# Patient Record
Sex: MALE | HISPANIC OR LATINO | URBAN - METROPOLITAN AREA
[De-identification: names, ages, dates, MRNs, and addresses within clinical notes are randomized per-mention and may not be internally consistent; named-entity substitution may affect disease eponyms.]

---

## 2018-07-30 ENCOUNTER — CONSULT (OUTPATIENT)
Dept: URBAN - METROPOLITAN AREA CLINIC 30 | Facility: CLINIC | Age: 53
End: 2018-07-30
Payer: COMMERCIAL

## 2018-07-30 PROCEDURE — 92004 COMPRE OPH EXAM NEW PT 1/>: CPT | Performed by: OPHTHALMOLOGY

## 2018-07-30 PROCEDURE — 92134 CPTRZ OPH DX IMG PST SGM RTA: CPT | Performed by: OPHTHALMOLOGY

## 2018-07-30 ASSESSMENT — INTRAOCULAR PRESSURE
OD: 15
OS: 10

## 2019-04-22 ENCOUNTER — FOLLOW UP ESTABLISHED (OUTPATIENT)
Dept: URBAN - METROPOLITAN AREA CLINIC 30 | Facility: CLINIC | Age: 54
End: 2019-04-22
Payer: COMMERCIAL

## 2019-04-22 PROCEDURE — 92134 CPTRZ OPH DX IMG PST SGM RTA: CPT | Performed by: OPHTHALMOLOGY

## 2019-04-22 PROCEDURE — 92014 COMPRE OPH EXAM EST PT 1/>: CPT | Performed by: OPHTHALMOLOGY

## 2019-04-22 ASSESSMENT — INTRAOCULAR PRESSURE
OD: 18
OS: 14

## 2019-05-20 ENCOUNTER — FOLLOW UP ESTABLISHED (OUTPATIENT)
Dept: URBAN - METROPOLITAN AREA CLINIC 30 | Facility: CLINIC | Age: 54
End: 2019-05-20
Payer: COMMERCIAL

## 2019-05-20 DIAGNOSIS — E11.3513 TYPE 2 DIAB WITH PROLIF DIAB RTNOP WITH MACULAR EDEMA, BI: Primary | ICD-10-CM

## 2019-05-20 PROCEDURE — 92235 FLUORESCEIN ANGRPH MLTIFRAME: CPT | Performed by: OPHTHALMOLOGY

## 2019-05-20 PROCEDURE — 92250 FUNDUS PHOTOGRAPHY W/I&R: CPT | Performed by: OPHTHALMOLOGY

## 2019-05-20 ASSESSMENT — INTRAOCULAR PRESSURE
OD: 26
OS: 22

## 2019-07-01 ENCOUNTER — FOLLOW UP ESTABLISHED (OUTPATIENT)
Dept: URBAN - METROPOLITAN AREA CLINIC 30 | Facility: CLINIC | Age: 54
End: 2019-07-01
Payer: COMMERCIAL

## 2019-07-01 DIAGNOSIS — Z79.4 LONG TERM (CURRENT) USE OF INSULIN: ICD-10-CM

## 2019-07-01 PROCEDURE — 92134 CPTRZ OPH DX IMG PST SGM RTA: CPT | Performed by: OPHTHALMOLOGY

## 2019-07-01 ASSESSMENT — INTRAOCULAR PRESSURE
OD: 16
OS: 16

## 2019-08-12 ENCOUNTER — FOLLOW UP ESTABLISHED (OUTPATIENT)
Dept: URBAN - METROPOLITAN AREA CLINIC 30 | Facility: CLINIC | Age: 54
End: 2019-08-12
Payer: COMMERCIAL

## 2019-08-12 PROCEDURE — 92014 COMPRE OPH EXAM EST PT 1/>: CPT | Performed by: OPHTHALMOLOGY

## 2019-08-12 PROCEDURE — 92134 CPTRZ OPH DX IMG PST SGM RTA: CPT | Performed by: OPHTHALMOLOGY

## 2019-08-12 ASSESSMENT — INTRAOCULAR PRESSURE
OD: 19
OS: 18

## 2019-11-18 ENCOUNTER — FOLLOW UP ESTABLISHED (OUTPATIENT)
Dept: URBAN - METROPOLITAN AREA CLINIC 30 | Facility: CLINIC | Age: 54
End: 2019-11-18
Payer: COMMERCIAL

## 2019-11-18 PROCEDURE — 92250 FUNDUS PHOTOGRAPHY W/I&R: CPT | Performed by: OPHTHALMOLOGY

## 2019-11-18 PROCEDURE — 92235 FLUORESCEIN ANGRPH MLTIFRAME: CPT | Performed by: OPHTHALMOLOGY

## 2020-01-13 ENCOUNTER — FOLLOW UP ESTABLISHED (OUTPATIENT)
Dept: URBAN - METROPOLITAN AREA CLINIC 30 | Facility: CLINIC | Age: 55
End: 2020-01-13
Payer: COMMERCIAL

## 2020-01-13 PROCEDURE — 92134 CPTRZ OPH DX IMG PST SGM RTA: CPT | Performed by: OPHTHALMOLOGY

## 2020-01-13 ASSESSMENT — INTRAOCULAR PRESSURE
OS: 21
OD: 21

## 2020-04-06 ENCOUNTER — FOLLOW UP ESTABLISHED (OUTPATIENT)
Dept: URBAN - METROPOLITAN AREA CLINIC 30 | Facility: CLINIC | Age: 55
End: 2020-04-06
Payer: COMMERCIAL

## 2020-04-06 DIAGNOSIS — H25.13 AGE-RELATED NUCLEAR CATARACT, BILATERAL: ICD-10-CM

## 2020-04-06 PROCEDURE — 92014 COMPRE OPH EXAM EST PT 1/>: CPT | Performed by: OPHTHALMOLOGY

## 2020-04-06 PROCEDURE — 92134 CPTRZ OPH DX IMG PST SGM RTA: CPT | Performed by: OPHTHALMOLOGY

## 2020-04-06 ASSESSMENT — INTRAOCULAR PRESSURE
OS: 16
OD: 15

## 2020-05-18 ENCOUNTER — FOLLOW UP ESTABLISHED (OUTPATIENT)
Dept: URBAN - METROPOLITAN AREA CLINIC 30 | Facility: CLINIC | Age: 55
End: 2020-05-18
Payer: COMMERCIAL

## 2020-05-18 PROCEDURE — 92250 FUNDUS PHOTOGRAPHY W/I&R: CPT | Performed by: OPHTHALMOLOGY

## 2020-05-18 ASSESSMENT — INTRAOCULAR PRESSURE
OD: 22
OS: 17

## 2020-08-24 ENCOUNTER — FOLLOW UP ESTABLISHED (OUTPATIENT)
Dept: URBAN - METROPOLITAN AREA CLINIC 30 | Facility: CLINIC | Age: 55
End: 2020-08-24
Payer: COMMERCIAL

## 2020-08-24 PROCEDURE — 92235 FLUORESCEIN ANGRPH MLTIFRAME: CPT | Performed by: OPHTHALMOLOGY

## 2020-08-24 PROCEDURE — 92250 FUNDUS PHOTOGRAPHY W/I&R: CPT | Performed by: OPHTHALMOLOGY

## 2020-08-24 ASSESSMENT — INTRAOCULAR PRESSURE
OS: 19
OD: 20

## 2020-11-02 ENCOUNTER — FOLLOW UP ESTABLISHED (OUTPATIENT)
Dept: URBAN - METROPOLITAN AREA CLINIC 30 | Facility: CLINIC | Age: 55
End: 2020-11-02
Payer: COMMERCIAL

## 2020-11-02 PROCEDURE — 92134 CPTRZ OPH DX IMG PST SGM RTA: CPT | Performed by: OPHTHALMOLOGY

## 2020-11-02 ASSESSMENT — INTRAOCULAR PRESSURE
OS: 19
OD: 22

## 2021-01-26 ENCOUNTER — FOLLOW UP ESTABLISHED (OUTPATIENT)
Dept: URBAN - METROPOLITAN AREA CLINIC 10 | Facility: CLINIC | Age: 56
End: 2021-01-26
Payer: COMMERCIAL

## 2021-01-26 PROCEDURE — 99214 OFFICE O/P EST MOD 30 MIN: CPT | Performed by: OPHTHALMOLOGY

## 2021-01-26 PROCEDURE — 92134 CPTRZ OPH DX IMG PST SGM RTA: CPT | Performed by: OPHTHALMOLOGY

## 2021-01-26 PROCEDURE — 67515 INJECT/TREAT EYE SOCKET: CPT | Performed by: OPHTHALMOLOGY

## 2021-01-26 ASSESSMENT — INTRAOCULAR PRESSURE
OD: 16
OS: 20

## 2021-04-05 ENCOUNTER — OFFICE VISIT (OUTPATIENT)
Dept: URBAN - METROPOLITAN AREA CLINIC 30 | Facility: CLINIC | Age: 56
End: 2021-04-05
Payer: COMMERCIAL

## 2021-04-05 PROCEDURE — 92235 FLUORESCEIN ANGRPH MLTIFRAME: CPT | Performed by: OPHTHALMOLOGY

## 2021-04-05 PROCEDURE — 92134 CPTRZ OPH DX IMG PST SGM RTA: CPT | Performed by: OPHTHALMOLOGY

## 2021-04-05 PROCEDURE — 67028 INJECTION EYE DRUG: CPT | Performed by: OPHTHALMOLOGY

## 2021-04-05 ASSESSMENT — INTRAOCULAR PRESSURE
OS: 19
OD: 27

## 2021-04-05 NOTE — IMPRESSION/PLAN
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 Joann Rosen - Good prior PRP Lsr / Kimmy Bustamante inj - PERSIST DME '18 -POOR. MUST improve BG. AMA '18-19 d/t loss job - RESUMED]]. TODAY, EyLea OU (proc note)   Imprv'd  BG / wt / BP. Counseled pt   
   RTC extend 8-9w ND/inj Eylea OS (SUSPEND OD xJan '21)    Future surgx OS Future Phaco / VIT / Laser OS  to  reduce inj? May suspend OD or FLTx OD ?

## 2021-04-05 NOTE — IMPRESSION/PLAN
Impression: Use of insulin Plan: URGED pt to do his part to lower BG / BP. MUST Improve BG
  Prior tx Dr. Ander Cruz / Mariana Canales.  PDR prior [facility] OU '17 DM / Drew / Luis Manuel Mendez OU injx      PERSIST DME '18 - 19 MUST have approvals Kody Zuniga / Luis Manuel Mendez for injx care, AMA '18-19 d/t job / insurance ?

## 2021-06-14 ENCOUNTER — OFFICE VISIT (OUTPATIENT)
Dept: URBAN - METROPOLITAN AREA CLINIC 30 | Facility: CLINIC | Age: 56
End: 2021-06-14
Payer: COMMERCIAL

## 2021-06-14 PROCEDURE — 92134 CPTRZ OPH DX IMG PST SGM RTA: CPT | Performed by: OPHTHALMOLOGY

## 2021-06-14 PROCEDURE — 67028 INJECTION EYE DRUG: CPT | Performed by: OPHTHALMOLOGY

## 2021-06-14 ASSESSMENT — INTRAOCULAR PRESSURE
OS: 23
OD: 16

## 2021-06-14 NOTE — IMPRESSION/PLAN
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 Wilmer Foss - Good prior PRP Lsr / Ronald Pugh inj - PERSIST DME '18 -POOR. MUST improve BG. AMA '18-19 d/t loss job - RESUMED]]. TODAY, EyLea OS (proc note)   Imprv'd  BG / wt / BP. Counseled pt   
   RTC extend 9w dil, OCT, eval - h/o Eylea OS (SUSPEND OD xJan '21)  Future surg OS Future Phaco / VIT / Laser OS  to  reduce inj Summer '21? May suspend OD /FLTx OD ?

## 2021-08-09 ENCOUNTER — OFFICE VISIT (OUTPATIENT)
Dept: URBAN - METROPOLITAN AREA CLINIC 30 | Facility: CLINIC | Age: 56
End: 2021-08-09
Payer: COMMERCIAL

## 2021-08-09 PROCEDURE — 92134 CPTRZ OPH DX IMG PST SGM RTA: CPT | Performed by: OPHTHALMOLOGY

## 2021-08-09 PROCEDURE — 99214 OFFICE O/P EST MOD 30 MIN: CPT | Performed by: OPHTHALMOLOGY

## 2021-08-09 PROCEDURE — 67028 INJECTION EYE DRUG: CPT | Performed by: OPHTHALMOLOGY

## 2021-08-09 ASSESSMENT — INTRAOCULAR PRESSURE
OD: 13
OS: 20

## 2021-08-09 NOTE — IMPRESSION/PLAN
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 Taylor Tolentino - Good prior PRP Lsr / Katya Menchaca inj - PERSIST DME '18 -POOR. MUST improve BG. AMA '18-19 d/t loss job - RESUMED]]. TODAY, EyLea OS (proc note)   Imprv'd  BG / wt / BP. Counseled pt   
   RTC extend 10-11w FA /  Eylea OS (SUSPEND OD xJan '21)     Future surg OS Future Phaco / VIT / Laser OS  to  reduce inj FALL '21? May suspend OD /FLTx OD ?

## 2021-08-09 NOTE — IMPRESSION/PLAN
Impression: Use of insulin Plan: URGED pt to do his part to lower BG / BP. MUST Improve BG
  Prior tx Dr. Leatha Kinney / Suri Real.  PDR prior [facility] OU '17 DM / Oni Olivarez / Doylene Needle OU injx      PERSIST DME '18 - 19 MUST have approvals Elly Andrews / Doylene Tucker for injx care,  AMA '18-19 d/t job / insurance ?

## 2021-08-09 NOTE — IMPRESSION/PLAN
Impression: Cataract OU Plan: TODAY repeat exam w DM cataract worse than expected for age (d/t DM). Again, URGED pt to keep f/u w gen eye care team.    Future phaco / vit if difficult comply w chronic injx or if recur DME w extended inj?    FALL '21 plan VIT / PHACO

## 2021-11-15 ENCOUNTER — OFFICE VISIT (OUTPATIENT)
Dept: URBAN - METROPOLITAN AREA CLINIC 30 | Facility: CLINIC | Age: 56
End: 2021-11-15
Payer: COMMERCIAL

## 2021-11-15 PROCEDURE — 67028 INJECTION EYE DRUG: CPT | Performed by: OPHTHALMOLOGY

## 2021-11-15 PROCEDURE — 92235 FLUORESCEIN ANGRPH MLTIFRAME: CPT | Performed by: OPHTHALMOLOGY

## 2021-11-15 PROCEDURE — 92134 CPTRZ OPH DX IMG PST SGM RTA: CPT | Performed by: OPHTHALMOLOGY

## 2021-11-15 PROCEDURE — 92250 FUNDUS PHOTOGRAPHY W/I&R: CPT | Performed by: OPHTHALMOLOGY

## 2021-11-15 ASSESSMENT — INTRAOCULAR PRESSURE
OD: 24
OS: 26

## 2021-11-15 NOTE — IMPRESSION/PLAN
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 Suri Lucero Pour - Good prior PRP Lsr / Doylene Needle inj - PERSIST DME '18 -POOR. MUST improve BG. AMA '18-19 d/t loss job - RESUMED]]. TODAY, EyLea OS (proc note)   Imprv'd  BG/wt/ BP. Counseled pt   
    RTC   (SUSPEND OD xJan '21)  OK NOW to plan Phaco / VIT   --- WIll be LEFT eye PHaco/VIT /FLTx Lsr (LIGHT PRP) OS to reduce inj - May suspend OD /FLTx OD ? Future extend injx OS too.

## 2021-11-15 NOTE — IMPRESSION/PLAN
Impression: Cataract OU Plan: TODAY repeat exam w DM cataract worse than expected for age (d/t DM). Again, URGED pt to keep f/u w gen eye care team.    Future phaco / vit if difficult comply w chronic injx or if recur DME w extended inj? FALL '21 plan VIT / PHACO  -- see other plan.

## 2021-11-15 NOTE — IMPRESSION/PLAN
Impression: Use of insulin - URGED pt to do his part to lower BG / BP. Plan:   MUST Improve BG -- Prior tx Dr. Jovan Nagel / Taylor Mancera.  PDR prior [facility] OU '17 DM / Drew / Katya Menchaca OU injx      PERSIST DME '18 - 19 MUST have approvals Stephanie Fleming / Katya Menchaca for injx care,  AMA '18-19 d/t job / insurance ?

## 2022-01-11 ENCOUNTER — TESTING ONLY (OUTPATIENT)
Dept: URBAN - METROPOLITAN AREA CLINIC 30 | Facility: CLINIC | Age: 57
End: 2022-01-11
Payer: COMMERCIAL

## 2022-01-11 DIAGNOSIS — Z01.818 ENCOUNTER FOR OTHER PREPROCEDURAL EXAMINATION: Primary | ICD-10-CM

## 2022-01-11 PROCEDURE — 99203 OFFICE O/P NEW LOW 30 MIN: CPT | Performed by: PHYSICIAN ASSISTANT

## 2022-01-11 RX ORDER — ATORVASTATIN CALCIUM 10 MG/1
10 MG TABLET, FILM COATED ORAL
Qty: 0 | Refills: 0 | Status: ACTIVE
Start: 2022-01-11

## 2022-01-11 RX ORDER — INSULIN DETEMIR 100 [IU]/ML
INJECTION, SOLUTION SUBCUTANEOUS
Qty: 0 | Refills: 0 | Status: ACTIVE
Start: 2022-01-11

## 2022-01-11 RX ORDER — LISINOPRIL 40 MG/1
40 MG TABLET ORAL
Qty: 0 | Refills: 0 | Status: ACTIVE
Start: 2022-01-11

## 2022-01-11 RX ORDER — METFORMIN HYDROCHLORIDE 1000 MG/1
TABLET, FILM COATED ORAL
Qty: 0 | Refills: 0 | Status: ACTIVE
Start: 2022-01-11

## 2022-01-11 ASSESSMENT — PACHYMETRY
OS: 3.08
OD: 23.15
OS: 23.17
OD: 3.14

## 2022-01-14 ENCOUNTER — SURGERY (OUTPATIENT)
Dept: URBAN - METROPOLITAN AREA SURGERY 12 | Facility: SURGERY | Age: 57
End: 2022-01-14
Payer: COMMERCIAL

## 2022-01-14 PROCEDURE — 67040 LASER TREATMENT OF RETINA: CPT | Performed by: OPHTHALMOLOGY

## 2022-01-15 ENCOUNTER — POST-OPERATIVE VISIT (OUTPATIENT)
Dept: URBAN - METROPOLITAN AREA CLINIC 10 | Facility: CLINIC | Age: 57
End: 2022-01-15
Payer: COMMERCIAL

## 2022-01-15 DIAGNOSIS — Z48.810 ENCOUNTER FOR SURGICAL AFTERCARE FOLLOWING SURGERY ON A SENSE ORGAN: Primary | ICD-10-CM

## 2022-01-15 PROCEDURE — 99024 POSTOP FOLLOW-UP VISIT: CPT | Performed by: OPTOMETRIST

## 2022-01-15 ASSESSMENT — INTRAOCULAR PRESSURE: OS: 22

## 2022-01-15 NOTE — IMPRESSION/PLAN
Impression:  Encounter for surgical aftercare following surgery on a sense organ  Z48.810.  Plan: cont w gtts as anson and f/u w dr Summer Sui

## 2022-01-28 ENCOUNTER — OFFICE VISIT (OUTPATIENT)
Dept: URBAN - METROPOLITAN AREA CLINIC 10 | Facility: CLINIC | Age: 57
End: 2022-01-28
Payer: COMMERCIAL

## 2022-01-28 PROCEDURE — 92134 CPTRZ OPH DX IMG PST SGM RTA: CPT | Performed by: OPHTHALMOLOGY

## 2022-01-28 PROCEDURE — 99024 POSTOP FOLLOW-UP VISIT: CPT | Performed by: OPHTHALMOLOGY

## 2022-01-28 ASSESSMENT — INTRAOCULAR PRESSURE
OD: 11
OS: 13

## 2022-01-28 NOTE — IMPRESSION/PLAN
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 Harry Brain - Prior PRP Lsr/ EyLea inj - PERSIST DME '18 -POOR. MUST lower BG. AMA '18-19 d/t loss job - RESUMED / VIT/Phaco/Lsr OS Jan '22]] TODAY,post op IMPROVED / DME less. ERM peeled. Imprv'd  BG/wt/ BP 
    RTC 6-8w pos colors/ OCT (SUSPEND OD xJan '21 --  PRN inj Eylea OS)

## 2022-03-25 ENCOUNTER — OFFICE VISIT (OUTPATIENT)
Dept: URBAN - METROPOLITAN AREA CLINIC 10 | Facility: CLINIC | Age: 57
End: 2022-03-25
Payer: COMMERCIAL

## 2022-03-25 PROCEDURE — 99214 OFFICE O/P EST MOD 30 MIN: CPT | Performed by: OPHTHALMOLOGY

## 2022-03-25 PROCEDURE — 92250 FUNDUS PHOTOGRAPHY W/I&R: CPT | Performed by: OPHTHALMOLOGY

## 2022-03-25 PROCEDURE — 92134 CPTRZ OPH DX IMG PST SGM RTA: CPT | Performed by: OPHTHALMOLOGY

## 2022-03-25 ASSESSMENT — INTRAOCULAR PRESSURE
OD: 18
OS: 18

## 2022-03-25 NOTE — IMPRESSION/PLAN
Impression: Cataract OD / PCIOL OS Plan: Done w VIT / PHACO OS  -- Drew OD monitor OD w [facility] eye care

## 2022-03-25 NOTE — IMPRESSION/PLAN
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 Isaiah Rahman - Prior PRP Lsr/ EyLea inj - PERSIST DME '18 -POOR. MUST lower BG. AMA '18-19 d/t loss job - RESUMED / VIT/Phaco/Lsr OS Jan '22]] TODAY IMPROVED / DME reduced w ERM peeled.   Imprv'd  BG/wt/ BP 
    RTC 8-10w pos colors/ OCT (SUSPEND OD xJan '21 --  PRN inj Eylea OS) What Type Of Note Output Would You Prefer (Optional)?: Standard Output How Severe Are Your Spot(S)?: mild Have Your Spot(S) Been Treated In The Past?: has not been treated Hpi Title: Evaluation of Skin Lesions Additional History: Patient presents today for full body skin exam Family Member: Mother

## 2022-08-02 ENCOUNTER — OFFICE VISIT (OUTPATIENT)
Dept: URBAN - METROPOLITAN AREA CLINIC 10 | Facility: CLINIC | Age: 57
End: 2022-08-02
Payer: COMMERCIAL

## 2022-08-02 DIAGNOSIS — H25.13 AGE-RELATED NUCLEAR CATARACT, BILATERAL: ICD-10-CM

## 2022-08-02 DIAGNOSIS — E11.3513 TYPE 2 DIABETES MELLITUS WITH PROLIFERATIVE DIABETIC RETINOPATHY WITH MACULAR EDEMA, BILATERAL: Primary | ICD-10-CM

## 2022-08-02 PROCEDURE — 99214 OFFICE O/P EST MOD 30 MIN: CPT | Performed by: OPHTHALMOLOGY

## 2022-08-02 PROCEDURE — 92134 CPTRZ OPH DX IMG PST SGM RTA: CPT | Performed by: OPHTHALMOLOGY

## 2022-08-02 PROCEDURE — 92250 FUNDUS PHOTOGRAPHY W/I&R: CPT | Performed by: OPHTHALMOLOGY

## 2022-08-02 ASSESSMENT — INTRAOCULAR PRESSURE
OD: 17
OS: 18

## 2022-08-02 NOTE — IMPRESSION/PLAN
Impression: Cataract OD / PCIOL OS Plan: Done w VIT / PHACO OS  -- Drew OD monitor OD w gen eye care OK f/u GEN eye 2mo.

## 2022-08-02 NOTE — IMPRESSION/PLAN
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 Janice Murray - Prior PRP Lsr/ EyLea inj - PERSIST DME '18 -POOR. MUST lower BG. AMA '18-19 d/t loss job - RESUMED / VIT/Phaco/Lsr OS Jan '22]] TODAY IMPRV'd / DME reduced w ERM peeled. Imprv'd  BG/wt/ BP 
    RTC 8w [facility] eye care RE-ESTABLISH now      RTC 12w pos colors/ OCT (SUSPEND OD xJan '21 --  PRN inj Eylea OS)

## 2022-10-13 ENCOUNTER — OFFICE VISIT (OUTPATIENT)
Dept: URBAN - METROPOLITAN AREA CLINIC 30 | Facility: CLINIC | Age: 57
End: 2022-10-13
Payer: COMMERCIAL

## 2022-10-13 DIAGNOSIS — Z96.1 PRESENCE OF INTRAOCULAR LENS: ICD-10-CM

## 2022-10-13 DIAGNOSIS — H53.142 PHOTOPHOBIA OF LEFT EYE: Primary | ICD-10-CM

## 2022-10-13 DIAGNOSIS — E11.3513 TYPE 2 DIABETES MELLITUS WITH PROLIFERATIVE DIABETIC RETINOPATHY WITH MACULAR EDEMA, BILATERAL: ICD-10-CM

## 2022-10-13 DIAGNOSIS — H25.811 COMBINED FORMS OF AGE-RELATED CATARACT, RIGHT EYE: ICD-10-CM

## 2022-10-13 DIAGNOSIS — H43.391 OTHER VITREOUS OPACITIES, RIGHT EYE: ICD-10-CM

## 2022-10-13 PROCEDURE — 99214 OFFICE O/P EST MOD 30 MIN: CPT | Performed by: OPTOMETRIST

## 2022-10-13 RX ORDER — PREDNISOLONE ACETATE 10 MG/ML
1 % SUSPENSION/ DROPS OPHTHALMIC
Qty: 10 | Refills: 0 | Status: ACTIVE
Start: 2022-10-13

## 2022-10-13 ASSESSMENT — KERATOMETRY
OS: 44.38
OD: 43.95

## 2022-10-13 ASSESSMENT — INTRAOCULAR PRESSURE
OD: 16
OS: 17

## 2022-10-13 ASSESSMENT — VISUAL ACUITY
OD: 20/40
OS: 20/60

## 2022-10-13 NOTE — IMPRESSION/PLAN
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 Plan: Pt notes this has been persisting since surgery Jan 2022. Wears sunglasses indoors/closes eye frequently. Likely chronic inflammation since sx. Post synechiae on exam. Start pred BID OS. Check in 2 months. See Dr. Jyotsna Orellana as scheduled.

## 2022-10-13 NOTE — IMPRESSION/PLAN
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 Continue care c Dr. Teresita Coyne as scheduled.

## 2022-10-13 NOTE — IMPRESSION/PLAN
Impression: Other vitreous opacities, right eye: H43.391. Plan: Pt educ on findings. Retinas flat and attached OU. Reviewed signs and symptoms of RD and to call asap if occurs.

## 2022-10-31 ENCOUNTER — OFFICE VISIT (OUTPATIENT)
Dept: URBAN - METROPOLITAN AREA CLINIC 30 | Facility: CLINIC | Age: 57
End: 2022-10-31
Payer: COMMERCIAL

## 2022-10-31 DIAGNOSIS — E11.3513 TYPE 2 DIABETES MELLITUS WITH PROLIFERATIVE DIABETIC RETINOPATHY WITH MACULAR EDEMA, BILATERAL: Primary | ICD-10-CM

## 2022-10-31 PROCEDURE — 99214 OFFICE O/P EST MOD 30 MIN: CPT | Performed by: OPHTHALMOLOGY

## 2022-10-31 PROCEDURE — 92250 FUNDUS PHOTOGRAPHY W/I&R: CPT | Performed by: OPHTHALMOLOGY

## 2022-10-31 PROCEDURE — 92134 CPTRZ OPH DX IMG PST SGM RTA: CPT | Performed by: OPHTHALMOLOGY

## 2022-10-31 ASSESSMENT — INTRAOCULAR PRESSURE
OS: 17
OD: 15

## 2022-10-31 NOTE — IMPRESSION/PLAN
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 Isaiah Rahman - Prior PRP Lsr/ EyLea inj - PERSIST DME '18 -POOR. MUST lower BG. AMA '18-19 d/t loss job - RESUMED / VIT/Phaco/Lsr OS Jan '22]] TODAY IMPRV'd / DME reduced w ERM peeled. Imprv'd  BG/wt/ BP 
    RTC w [facility] RE-ESTABLISHED now  -- APPRECIATE Dr. Heladio Young      RTC 5-6m pos FA / OCT (SUSPEND OD xJan '21 --  PRN inj Eylea OS)

## 2023-04-17 ENCOUNTER — OFFICE VISIT (OUTPATIENT)
Dept: URBAN - METROPOLITAN AREA CLINIC 30 | Facility: CLINIC | Age: 58
End: 2023-04-17
Payer: COMMERCIAL

## 2023-04-17 DIAGNOSIS — H25.13 AGE-RELATED NUCLEAR CATARACT, BILATERAL: ICD-10-CM

## 2023-04-17 DIAGNOSIS — E11.3513 TYPE 2 DIABETES MELLITUS WITH PROLIFERATIVE DIABETIC RETINOPATHY WITH MACULAR EDEMA, BILATERAL: Primary | ICD-10-CM

## 2023-04-17 PROCEDURE — 67028 INJECTION EYE DRUG: CPT | Performed by: OPHTHALMOLOGY

## 2023-04-17 PROCEDURE — 92134 CPTRZ OPH DX IMG PST SGM RTA: CPT | Performed by: OPHTHALMOLOGY

## 2023-04-17 PROCEDURE — 92235 FLUORESCEIN ANGRPH MLTIFRAME: CPT | Performed by: OPHTHALMOLOGY

## 2023-04-17 PROCEDURE — 99214 OFFICE O/P EST MOD 30 MIN: CPT | Performed by: OPHTHALMOLOGY

## 2023-04-17 PROCEDURE — 92250 FUNDUS PHOTOGRAPHY W/I&R: CPT | Performed by: OPHTHALMOLOGY

## 2023-04-17 ASSESSMENT — INTRAOCULAR PRESSURE
OS: 17
OD: 17

## 2023-04-17 NOTE — IMPRESSION/PLAN
Impression: Cataract OD / PCIOL OS Plan: Done w VIT / PHACO OS  -- Drew OD monitor OD w [facility] eye

## 2023-04-17 NOTE — IMPRESSION/PLAN
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 Perfecto Daniels - Prior PRP Lsr/EyLea inj - PERSIST DME '18 - MUST lower BG. AMA '18-19 d/t loss job - RESUME/ VIT/Phaco/Lsr OS Jan '22 - ERM peeled Altamese Yannick '22]] TODAY FAIL - RECUR DME OS>OD when OFF meds '22-23 Pt notes OZEMPIC shortage w NO insulin from PCP ERM peeled. TODAY ADD Sample Eylea OD / Vabysmo OS (proc note) RTC 4w ND/inj/OCT plan Vabysmo OU #2/4  (FAILED Eylea) After 4/4 injx may go back to MONITOR again if BG good -- KEEP [facility] eye care RE-ESTABLISHED -- APPRECIATE Dr. Heladio Young

## 2023-05-15 ENCOUNTER — OFFICE VISIT (OUTPATIENT)
Dept: URBAN - METROPOLITAN AREA CLINIC 30 | Facility: CLINIC | Age: 58
End: 2023-05-15
Payer: COMMERCIAL

## 2023-05-15 DIAGNOSIS — E11.3513 TYPE 2 DIABETES MELLITUS WITH PROLIFERATIVE DIABETIC RETINOPATHY WITH MACULAR EDEMA, BILATERAL: Primary | ICD-10-CM

## 2023-05-15 PROCEDURE — 92134 CPTRZ OPH DX IMG PST SGM RTA: CPT | Performed by: OPHTHALMOLOGY

## 2023-05-15 ASSESSMENT — INTRAOCULAR PRESSURE
OS: 21
OD: 19

## 2023-05-15 NOTE — IMPRESSION/PLAN
Impression: DM2 prolif PDR / DME OU- prior PRP OU '17, EyLea FAIL /use Vabysm Plan: Hx:[[PRIOR Dr. Molly Arce - Prior PRP Lsr/EyLea inj - PERSIST DME '18 - MUST lower BG. AMA '18-19 d/t loss job - RESUME/ VIT/Phaco/Lsr OS Jan '22 - ERM peeled Ghazal Jacobs '22 -- Eylea FAIL RECUR DME OS>OD if OFF DM meds '22-23 --Blames Ozempic shortage w NO insulin from PCP --- ERM peeled -- RESUME injx w VABYSMO '23]]. TODAY inj  EYLEA Sample OU (proc note - FORCED step to Vabysmo) RTC 4w dil, colors, eval - h/o Eylea OU #3/4  (FAILED Eylea prior -- FORCED Eylea in '23 and appeal VABYSMO (See YOSEMITE and MELQUIADES trials - clearly superior drying and improved outcomes w less injx). After 4/4 injx may go back to MONITOR again if BG good -- KEEP [facility] eye [facility] RE-ESTABLISHED -- APPRECIATE Dr. William Lamar for all gen eye care -- NOTE:  PATIENT ALREADY PROVEN to FAIL EYLEA -- INAPPROPRIATE of [employer] to force RE-STEP in '23. SEE Cristiana Kimberley May '23 . . .clearly VABYSMO Superior. Move through steps again Eylea in '23 and appeal VABYSMO (See YOSEMITE and MELQUIADES trials - clearly superior drying and improved outcomes w less injx).

## 2023-06-16 ENCOUNTER — OFFICE VISIT (OUTPATIENT)
Dept: URBAN - METROPOLITAN AREA CLINIC 10 | Facility: CLINIC | Age: 58
End: 2023-06-16
Payer: COMMERCIAL

## 2023-06-16 DIAGNOSIS — E11.3513 TYPE 2 DIABETES MELLITUS WITH PROLIFERATIVE DIABETIC RETINOPATHY WITH MACULAR EDEMA, BILATERAL: Primary | ICD-10-CM

## 2023-06-16 DIAGNOSIS — H25.13 AGE-RELATED NUCLEAR CATARACT, BILATERAL: ICD-10-CM

## 2023-06-16 PROCEDURE — 92134 CPTRZ OPH DX IMG PST SGM RTA: CPT | Performed by: OPHTHALMOLOGY

## 2023-06-16 PROCEDURE — 99214 OFFICE O/P EST MOD 30 MIN: CPT | Performed by: OPHTHALMOLOGY

## 2023-06-16 ASSESSMENT — INTRAOCULAR PRESSURE
OS: 15
OD: 19

## 2023-06-16 NOTE — IMPRESSION/PLAN
Impression: Cataract OD / PCIOL OS Plan: Done w VIT / PHACO OS  -- Drew OD repeated eval shows progression -- monitor

## 2023-06-16 NOTE — IMPRESSION/PLAN
Impression: DM2 prolif PDR / DME OU- prior PRP OU '17, EyLea FAIL /use Vabysm Plan: Hx:[[PRIOR Dr. Mike Langley - Prior PRP Lsr/EyLea inj - PERSIST DME '18 - MUST lower BG. AMA '18-19 d/t loss job - RESUME/ VIT/Phaco/Lsr OS Jan '22 - ERM peeled Pegge Les '22 -- Eylea FAIL RECUR DME OS>OD if OFF DM meds '22-23 --Blames Ozempic shortage w NO insulin from PCP --- ERM peeled -- RESUME injx w VABYSMO '23]]. TODAY inj  Avastin OU (proc note - FORCED step back Eylea / Avst /to Vabysmo) RTC 4w plan FA / plan Avst OU #4/4  (FAILED Eylea prior -- FORCED Eylea in '23 and step back Avastin -- Future may need appeal VABYSMO (See YOEB and MELQUIADES trials - clearly superior drying and improved outcomes w less injx). After 4/4 injx may go back to MONITOR again if BG good -- KEEP Gen eye care RE-ESTABLISHED -- APPRECIATE Dr. Monik Greenberg for all gen eye care -- NOTE:  PATIENT ALREADY PROVEN to FAIL EYLEA -- INAPPROPRIATE of [employer] to force RE-STEP in '23. SEE Karri Ybarra May '23 . . .clearly VABYSMO Superior. Move through steps again Eylea in '23 and appeal VABYSMO (See YOSEMITE and MELQUIADES trials - clearly superior drying and improved outcomes w less injx).

## 2023-07-28 ENCOUNTER — OFFICE VISIT (OUTPATIENT)
Dept: URBAN - METROPOLITAN AREA CLINIC 24 | Facility: CLINIC | Age: 58
End: 2023-07-28
Payer: COMMERCIAL

## 2023-07-28 DIAGNOSIS — E11.3513 TYPE 2 DIABETES MELLITUS WITH PROLIFERATIVE DIABETIC RETINOPATHY WITH MACULAR EDEMA, BILATERAL: Primary | ICD-10-CM

## 2023-07-28 PROCEDURE — 92235 FLUORESCEIN ANGRPH MLTIFRAME: CPT | Performed by: OPHTHALMOLOGY

## 2023-07-28 PROCEDURE — 92134 CPTRZ OPH DX IMG PST SGM RTA: CPT | Performed by: OPHTHALMOLOGY

## 2023-07-28 PROCEDURE — 92250 FUNDUS PHOTOGRAPHY W/I&R: CPT | Performed by: OPHTHALMOLOGY

## 2023-07-28 PROCEDURE — 99214 OFFICE O/P EST MOD 30 MIN: CPT | Performed by: OPHTHALMOLOGY

## 2023-07-28 ASSESSMENT — INTRAOCULAR PRESSURE
OD: 17
OS: 17

## 2023-08-28 ENCOUNTER — OFFICE VISIT (OUTPATIENT)
Dept: URBAN - METROPOLITAN AREA CLINIC 24 | Facility: CLINIC | Age: 58
End: 2023-08-28
Payer: COMMERCIAL

## 2023-08-28 DIAGNOSIS — E11.3513 TYPE 2 DIABETES MELLITUS WITH PROLIFERATIVE DIABETIC RETINOPATHY WITH MACULAR EDEMA, BILATERAL: Primary | ICD-10-CM

## 2023-08-28 PROCEDURE — 92134 CPTRZ OPH DX IMG PST SGM RTA: CPT | Performed by: OPHTHALMOLOGY

## 2023-08-28 ASSESSMENT — INTRAOCULAR PRESSURE
OS: 18
OD: 20

## 2023-09-29 ENCOUNTER — OFFICE VISIT (OUTPATIENT)
Dept: URBAN - METROPOLITAN AREA CLINIC 24 | Facility: CLINIC | Age: 58
End: 2023-09-29
Payer: COMMERCIAL

## 2023-09-29 DIAGNOSIS — E11.3513 TYPE 2 DIABETES MELLITUS WITH PROLIFERATIVE DIABETIC RETINOPATHY WITH MACULAR EDEMA, BILATERAL: Primary | ICD-10-CM

## 2023-09-29 DIAGNOSIS — H25.13 AGE-RELATED NUCLEAR CATARACT, BILATERAL: ICD-10-CM

## 2023-09-29 PROCEDURE — 92134 CPTRZ OPH DX IMG PST SGM RTA: CPT | Performed by: OPHTHALMOLOGY

## 2023-09-29 PROCEDURE — 99214 OFFICE O/P EST MOD 30 MIN: CPT | Performed by: OPHTHALMOLOGY

## 2023-09-29 ASSESSMENT — INTRAOCULAR PRESSURE
OS: 14
OD: 15

## 2023-11-15 ENCOUNTER — OFFICE VISIT (OUTPATIENT)
Dept: URBAN - METROPOLITAN AREA CLINIC 24 | Facility: CLINIC | Age: 58
End: 2023-11-15
Payer: COMMERCIAL

## 2023-11-15 DIAGNOSIS — E11.3513 TYPE 2 DIABETES MELLITUS WITH PROLIFERATIVE DIABETIC RETINOPATHY WITH MACULAR EDEMA, BILATERAL: Primary | ICD-10-CM

## 2023-11-15 DIAGNOSIS — H25.13 AGE-RELATED NUCLEAR CATARACT, BILATERAL: ICD-10-CM

## 2023-11-15 PROCEDURE — 99214 OFFICE O/P EST MOD 30 MIN: CPT | Performed by: OPHTHALMOLOGY

## 2023-11-15 PROCEDURE — 92134 CPTRZ OPH DX IMG PST SGM RTA: CPT | Performed by: OPHTHALMOLOGY

## 2024-02-05 ENCOUNTER — OFFICE VISIT (OUTPATIENT)
Dept: URBAN - METROPOLITAN AREA CLINIC 30 | Facility: CLINIC | Age: 59
End: 2024-02-05
Payer: COMMERCIAL

## 2024-02-05 PROCEDURE — 92134 CPTRZ OPH DX IMG PST SGM RTA: CPT | Performed by: OPHTHALMOLOGY

## 2024-02-05 PROCEDURE — 92250 FUNDUS PHOTOGRAPHY W/I&R: CPT | Performed by: OPHTHALMOLOGY

## 2024-02-05 PROCEDURE — 92235 FLUORESCEIN ANGRPH MLTIFRAME: CPT | Performed by: OPHTHALMOLOGY

## 2024-02-05 ASSESSMENT — INTRAOCULAR PRESSURE
OD: 15
OS: 15

## 2024-04-01 ENCOUNTER — OFFICE VISIT (OUTPATIENT)
Dept: URBAN - METROPOLITAN AREA CLINIC 30 | Facility: CLINIC | Age: 59
End: 2024-04-01
Payer: COMMERCIAL

## 2024-04-01 DIAGNOSIS — E11.3513 TYPE 2 DIABETES MELLITUS WITH PROLIFERATIVE DIABETIC RETINOPATHY WITH MACULAR EDEMA, BILATERAL: Primary | ICD-10-CM

## 2024-04-01 PROCEDURE — 92134 CPTRZ OPH DX IMG PST SGM RTA: CPT | Performed by: OPHTHALMOLOGY

## 2024-04-01 ASSESSMENT — INTRAOCULAR PRESSURE
OS: 14
OD: 19

## 2024-05-29 ENCOUNTER — OFFICE VISIT (OUTPATIENT)
Dept: URBAN - METROPOLITAN AREA CLINIC 24 | Facility: CLINIC | Age: 59
End: 2024-05-29
Payer: COMMERCIAL

## 2024-05-29 DIAGNOSIS — H25.13 AGE-RELATED NUCLEAR CATARACT, BILATERAL: ICD-10-CM

## 2024-05-29 DIAGNOSIS — E11.3513 TYPE 2 DIABETES MELLITUS WITH PROLIFERATIVE DIABETIC RETINOPATHY WITH MACULAR EDEMA, BILATERAL: Primary | ICD-10-CM

## 2024-05-29 PROCEDURE — 99214 OFFICE O/P EST MOD 30 MIN: CPT | Performed by: OPHTHALMOLOGY

## 2024-05-29 PROCEDURE — 92134 CPTRZ OPH DX IMG PST SGM RTA: CPT | Performed by: OPHTHALMOLOGY

## 2024-05-29 ASSESSMENT — INTRAOCULAR PRESSURE
OS: 9
OD: 10

## 2024-08-02 ENCOUNTER — OFFICE VISIT (OUTPATIENT)
Dept: URBAN - METROPOLITAN AREA CLINIC 24 | Facility: CLINIC | Age: 59
End: 2024-08-02
Payer: COMMERCIAL

## 2024-08-02 DIAGNOSIS — E11.3513 TYPE 2 DIABETES MELLITUS WITH PROLIFERATIVE DIABETIC RETINOPATHY WITH MACULAR EDEMA, BILATERAL: Primary | ICD-10-CM

## 2024-08-02 PROCEDURE — 92235 FLUORESCEIN ANGRPH MLTIFRAME: CPT | Performed by: OPHTHALMOLOGY

## 2024-08-02 PROCEDURE — 92134 CPTRZ OPH DX IMG PST SGM RTA: CPT | Performed by: OPHTHALMOLOGY

## 2024-08-02 ASSESSMENT — INTRAOCULAR PRESSURE
OS: 12
OD: 16